# Patient Record
Sex: FEMALE | Race: WHITE | URBAN - METROPOLITAN AREA
[De-identification: names, ages, dates, MRNs, and addresses within clinical notes are randomized per-mention and may not be internally consistent; named-entity substitution may affect disease eponyms.]

---

## 2019-01-02 ENCOUNTER — HOSPITAL ENCOUNTER (INPATIENT)
Age: 39
LOS: 2 days | Discharge: HOME | DRG: 885 | End: 2019-01-04
Attending: PSYCHIATRY & NEUROLOGY | Admitting: PSYCHIATRY & NEUROLOGY
Payer: COMMERCIAL

## 2019-01-02 PROBLEM — F32.A DEPRESSION: Status: ACTIVE | Noted: 2019-01-02

## 2019-01-02 PROCEDURE — 65220000003 HC RM SEMIPRIVATE PSYCH

## 2019-01-02 RX ORDER — LORAZEPAM 2 MG/ML
1-2 INJECTION INTRAMUSCULAR
Status: DISCONTINUED | OUTPATIENT
Start: 2019-01-02 | End: 2019-01-03

## 2019-01-02 RX ORDER — HYDROXYZINE PAMOATE 50 MG/1
50 CAPSULE ORAL
Status: DISCONTINUED | OUTPATIENT
Start: 2019-01-02 | End: 2019-01-04 | Stop reason: HOSPADM

## 2019-01-02 RX ORDER — IBUPROFEN 600 MG/1
600 TABLET ORAL
Status: DISCONTINUED | OUTPATIENT
Start: 2019-01-02 | End: 2019-01-04 | Stop reason: HOSPADM

## 2019-01-03 PROCEDURE — 65220000003 HC RM SEMIPRIVATE PSYCH

## 2019-01-03 NOTE — BH NOTES
GROUP THERAPY PROGRESS NOTE Makeda Betancourt is not  participating in [place]. Group time: 30 minutes Personal goal for participation: rules/regulations Goal orientation: community Group therapy participation: pt refused Therapeutic interventions reviewed and discussed:  
 
Impression of participation: pt refused

## 2019-01-03 NOTE — BH NOTES
MHT NOTE: The pt has remained isolated in her room for the majority of the morning and afternoon appearing to be asleep , and also reading in her bed. She ate breakfast and lunch. She did not attend groups. The pt has also spent a fair amount of time on the phone with her boyfriend appearing to be agitated and upset with him . She has been extremely tearful this shift. The staff was able to calm her down a few times. The pt has complied with utilizing the non-skid footwear that was provided for her safety. She did not experience any falls. The pt will continue to be monitored for behavior, location and safety for the remaining duration of the shift.
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
## 2019-01-03 NOTE — BSMART NOTE
ART THERAPY GROUP PROGRESS NOTE Group time:1330 The patient was unavailable for group.
OCCUPATIONAL THERAPY PROGRESS NOTE Group Time:  1574 Attendance: The patient attended full group. Participation: The patient participated fully in the activity. Marty Ugarte Attention: The patient was able to focus on the activity. Interaction: The patient acknowledges others or responds to questions,  with no spontaneous interaction. Tearful at times. Talked about daughter who is in college and now home on the school break. States she works 60-70 hours a week.
SOCIAL WORK GROUP THERAPY PROGRESS NOTE Group Time:  10am 
 
Group Topic:  Coping Skills Group Participation:  
Pt reportedly did not attend group due to medical issues & resting in room. After session met briefly with pt to explain tx process & give her names of her  & Kera Lemus. Also basic \"goal\" sheet given to help her start to look at ways to better take care of herself.
SW ENCOUNTER: The patient is a 43year old female whom presented for acute stabilization due to SI. She resides in [facility] with her boyfriend and daughter; is employed full time and has a BA in [employer]. The patient denied known medical issues, alleriges to foods or medications, prior psychiatric hospitalizations, prior SA treatment, history of cutting, legal issues, sexual trauma, physical abuse or neglect. The patient disclosed alcohol use (age of onset 21; daily use); denied further illicit substance use. The patient stated that she needs help with managing depressive symptoms.
Hypertension
Hypertension

## 2019-01-03 NOTE — PROGRESS NOTES
Problem: Falls - Risk of 
Goal: *Absence of Falls Document Karol Phillip Fall Risk and appropriate interventions in the flowsheet. Absent of falls daily while in hospital.  
Outcome: Progressing Towards Goal 
Patient is progressing as evidence by being free from falls during this nurse's shift. Patient has been compliant with non-skid footwear while ambulating and room is free of clutter. Problem: Suicide/Homicide (Adult/Pediatric) Goal: *STG: Remains safe in hospital 
Remains safe daily while in hospital.  
Outcome: Progressing Towards Goal 
Patient is progressing as evidence by being free from harm during this nurse's shift. Patient agrees to come to staff if feelings of self harm or harm to others arise. Comments: Patient has been tearful \"off and on\" during evening shift. Patient has eaten meals and has been compliant with assessments. Patient has not required PRN medications this shift. Patient has performed ADL's without prompting or assistance. Patient had 4 visitors this shift. Patient became upset at one point and loudly stated \"I'm fucking sick. Don't you see that! \"  Patient began to sob with face in hands and older gentleman got up to leave so 4th visitor could come upstairs. Visitors brought snacks, drinks and eyeglasses. Case, cleanser and cleaning cloth were placed in patient's locker. Patient was very happy to see last visitor, a young female, and both hugged tightly and began crying when visitor arrived onto unit. Patient did not have this reaction with other 3 visitors. Patient has been free from falls and harm this shift and agrees to come to staff if feelings of self harm or harm to others arise.

## 2019-01-04 VITALS
OXYGEN SATURATION: 99 % | SYSTOLIC BLOOD PRESSURE: 133 MMHG | DIASTOLIC BLOOD PRESSURE: 68 MMHG | TEMPERATURE: 97.8 F | RESPIRATION RATE: 16 BRPM | HEART RATE: 82 BPM

## 2019-01-04 PROBLEM — F33.2 MDD (MAJOR DEPRESSIVE DISORDER), RECURRENT SEVERE, WITHOUT PSYCHOSIS (HCC): Status: ACTIVE | Noted: 2019-01-02

## 2019-01-04 LAB — TSH SERPL DL<=0.05 MIU/L-ACNC: 0.9 UIU/ML (ref 0.36–3.74)

## 2019-01-04 PROCEDURE — 36415 COLL VENOUS BLD VENIPUNCTURE: CPT

## 2019-01-04 PROCEDURE — 84443 ASSAY THYROID STIM HORMONE: CPT

## 2019-01-04 RX ORDER — HYDROXYZINE PAMOATE 50 MG/1
50 CAPSULE ORAL
Qty: 15 CAP | Refills: 0 | Status: SHIPPED | OUTPATIENT
Start: 2019-01-04 | End: 2019-01-18

## 2019-01-04 NOTE — BH NOTES
Patient has been dicharged from court and will continue to follow up with mental health support with James Garcia at [facility]. Patient will schedule appointment today when she arrives home. Patient has been discharge from court and will follow up with appointment scheduling with PCP when she arrives home today. Patient has been provided with information regarding mental health follow up care, with (script) Provided. Patient has been educated regarding seeking additional support if needed with information listed on discharge paper work and emergency card provided. Patient has been escorted off unit to awaiting transportation for transport to home.

## 2019-01-04 NOTE — DISCHARGE INSTRUCTIONS
BEHAVIORAL HEALTH NURSING DISCHARGE NOTE      Emergency Numbers    : [facility] Emergency Services: [phone]  Suicide Prevention Line: [phone] (TALK)      The following personal items collected during your admission are returned to you:   Dental Appliance: Dental Appliances: None  Vision: Visual Aid: None  Hearing Aid:    Jewelry: Jewelry: None  Clothing: Clothing: Footwear, Shirt, Pants  Other Valuables: Other Valuables: None  Valuables sent to safe: The discharge information has been reviewed with the patient. The patient verbalized understanding. [employer] Activation    Thank you for requesting access to [facility]. Please follow the instructions below to securely access and download your online medical record. [facility] allows you to send messages to your doctor, view your test results, renew your prescriptions, schedule appointments, and more. How Do I Sign Up? 1. In your internet browser, go to www."[employer]  2. Click on the First Time User? Click Here link in the Sign In box. You will be redirect to the New Member Sign Up page. 3. Enter your [facility] Access Code exactly as it appears below. You will not need to use this code after youve completed the sign-up process. If you do not sign up before the expiration date, you must request a new code. [facility] Access Code: [email or web address]  Expires: 2019 10:42 AM (This is the date your [facility] access code will )    4. Enter the last four digits of your Social Security Number (xxxx) and Date of Birth (mm/dd/yyyy) as indicated and click Submit. You will be taken to the next sign-up page. 5. Create a [facility] ID. This will be your [facility] login ID and cannot be changed, so think of one that is secure and easy to remember. 6. Create a [facility] password. You can change your password at any time. 7. Enter your Password Reset Question and Answer.  This can be used at a later time if you forget your password. 8. Enter your e-mail address. You will receive e-mail notification when new information is available in [address] 9. Click Sign Up. You can now view and download portions of your medical record. 10. Click the Download Summary menu link to download a portable copy of your medical information. Additional Information    If you have questions, please visit the Frequently Asked Questions section of the [employer] website at [email or web address] Remember, [facility] is NOT to be used for urgent needs. For medical emergencies, dial 911.     Patient armband removed and shredded

## 2019-01-04 NOTE — BH NOTES
GROUP THERAPY PROGRESS NOTE Makeda Betancourt is participating in [facility]. Group time: 30 minutes Personal goal for participation: Reduce stress Goal orientation: relaxation Group therapy participation: active Therapeutic interventions reviewed and discussed: Stress management skills Impression of participation: Patient fully participated

## 2019-01-04 NOTE — BH NOTES
Patient is cooperative, patient goes to group therapy, patient takes medication as prescribed. Patient states she has a safety plan if she has thoughts of suicide. Patient denies thoughts of suicide, patient denies hallucinations, patient denies delusions will continue to monitor.
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
## 2019-07-26 NOTE — BH NOTES
Patient arrives sad and tearful. Cooperative. States boyfriend/fiance just told her to leave residence they were sharing. States it is possible to go live with family member. She is concerned about her living situation. Patient has ingested xanax and etoh along with tylenol PM in an attempt to commit suicide. She states no SI HI AVH. Presently. Patient has an 25year old daughter in college. Eats and tolerates snack given to patient and juices. Offers no complaints. Gripper socks and 15 minute checks in place for safety. Patient has a bachelor's degree and works in sales. Gait appropriate. Interacts with staff and peers appropriately but, minimally. Patient's last menstrual cycle 24 DEC 18. Patient does have job and works in sales. Will continue to monitor and support. Statement Selected